# Patient Record
Sex: MALE | Race: WHITE | NOT HISPANIC OR LATINO | URBAN - METROPOLITAN AREA
[De-identification: names, ages, dates, MRNs, and addresses within clinical notes are randomized per-mention and may not be internally consistent; named-entity substitution may affect disease eponyms.]

---

## 2022-06-02 ENCOUNTER — OFFICE VISIT (OUTPATIENT)
Dept: URGENT CARE | Facility: URGENT CARE | Age: 37
End: 2022-06-02
Payer: COMMERCIAL

## 2022-06-02 VITALS
TEMPERATURE: 98.5 F | WEIGHT: 241 LBS | SYSTOLIC BLOOD PRESSURE: 145 MMHG | DIASTOLIC BLOOD PRESSURE: 92 MMHG | HEART RATE: 84 BPM | OXYGEN SATURATION: 99 %

## 2022-06-02 DIAGNOSIS — H65.92 OME (OTITIS MEDIA WITH EFFUSION), LEFT: Primary | ICD-10-CM

## 2022-06-02 DIAGNOSIS — Z88.9 HISTORY OF SEASONAL ALLERGIES: ICD-10-CM

## 2022-06-02 PROCEDURE — 99203 OFFICE O/P NEW LOW 30 MIN: CPT | Performed by: NURSE PRACTITIONER

## 2022-06-02 RX ORDER — VARENICLINE TARTRATE 1 MG/1
1 TABLET, FILM COATED ORAL DAILY
COMMUNITY
Start: 2022-05-05

## 2022-06-02 RX ORDER — LEVOCETIRIZINE DIHYDROCHLORIDE 5 MG/1
5 TABLET, FILM COATED ORAL AT BEDTIME
COMMUNITY
Start: 2022-03-07

## 2022-06-02 RX ORDER — HYDROXYZINE PAMOATE 25 MG/1
CAPSULE ORAL
COMMUNITY
Start: 2022-02-25

## 2022-06-02 RX ORDER — FLUTICASONE PROPIONATE 50 MCG
SPRAY, SUSPENSION (ML) NASAL DAILY
COMMUNITY
Start: 2022-05-27

## 2022-06-02 RX ORDER — VENLAFAXINE HYDROCHLORIDE 37.5 MG/1
3 CAPSULE, EXTENDED RELEASE ORAL DAILY
COMMUNITY
Start: 2021-12-07

## 2022-06-02 NOTE — PROGRESS NOTES
Assessment & Plan     1. OME (otitis media with effusion), left  Verbalized understanding; will monitor symptoms closely. Reviewed s/e to medications.   Antibiotics as prescribed.  Recheck ear in 2 weeks.  Prescribed with augmentin  Side effects discussed warned about GI side effects and risk of cdiff.    - amoxicillin-clavulanate (AUGMENTIN) 875-125 MG tablet; Take 1 tablet by mouth 2 times daily for 7 days  Dispense: 14 tablet; Refill: 0    2. History of seasonal allergies    Patient Instructions   Warm compresses left ear.   Tylenol for pain.             Handout given and reviewed from epic    SHOSHANA Mabry [facility] [place]    Bryon Ledezma is a 36 year old male who presents to clinic today for the following health issues:  Chief Complaint   Patient presents with     Otalgia     Left ear pain, started last night, got very car sick yesterday-abnormal for him. Stuffy nose and mild cough.     HPI    Patient with a history of seasonal allergy currently is taking Xyzal and Flonase for this.  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  Last night when he went to bed noted that he had left ear pain this has been worsening.  Mild sinus congestion states this is normal with his seasonal allergies.  Denies productive cough states just mild dry cough.  Denies shortness of breath nausea vomiting or diarrhea.  URI Adult    Onset of symptoms was 1 day(s) ago.  Course of illness is waxing and waning.    Severity moderate  Current and Associated symptoms: stuffy nose and ear pain left  Treatment measures tried include Tylenol/Ibuprofen and Antihistamine.  Predisposing factors include None.        Review of Systems  Constitutional, HEENT, cardiovascular, pulmonary, gi and gu systems are negative, except as otherwise noted.      Objective    BP (!) 145/92   Pulse 84   Temp 98.5   F (36.9  C) (Tympanic)   Wt 109.3 kg (241 lb)   SpO2 99%   Physical Exam   GENERAL: healthy, alert and no distress  EYES: Eyes grossly normal to inspection, PERRL and conjunctivae and sclerae normal  HENT: normal cephalic/atraumatic, right ear: normal: no effusions, no erythema, normal landmarks, left ear: erythematous and bulging membrane, nose and mouth without ulcers or lesions, nasal mucosa edematous , rhinorrhea clear, oropharynx clear and oral mucous membranes moist  NECK: no adenopathy, no asymmetry, masses, or scars and thyroid normal to palpation  RESP: lungs clear to auscultation - no rales, rhonchi or wheezes  CV: regular rate and rhythm, normal S1 S2, no S3 or S4, no murmur, click or rub, no peripheral edema and peripheral pulses strong  MS: no gross musculoskeletal defects noted, no edema